# Patient Record
Sex: FEMALE | Race: WHITE | NOT HISPANIC OR LATINO | URBAN - METROPOLITAN AREA
[De-identification: names, ages, dates, MRNs, and addresses within clinical notes are randomized per-mention and may not be internally consistent; named-entity substitution may affect disease eponyms.]

---

## 2021-05-20 ENCOUNTER — OFFICE VISIT (OUTPATIENT)
Dept: URBAN - METROPOLITAN AREA CLINIC 70 | Facility: CLINIC | Age: 71
End: 2021-05-20
Payer: MEDICARE

## 2021-05-20 ENCOUNTER — LAB OUTSIDE AN ENCOUNTER (OUTPATIENT)
Dept: URBAN - METROPOLITAN AREA CLINIC 70 | Facility: CLINIC | Age: 71
End: 2021-05-20

## 2021-05-20 ENCOUNTER — WEB ENCOUNTER (OUTPATIENT)
Dept: URBAN - METROPOLITAN AREA CLINIC 70 | Facility: CLINIC | Age: 71
End: 2021-05-20

## 2021-05-20 ENCOUNTER — DASHBOARD ENCOUNTERS (OUTPATIENT)
Age: 71
End: 2021-05-20

## 2021-05-20 VITALS
HEIGHT: 62 IN | TEMPERATURE: 98.1 F | DIASTOLIC BLOOD PRESSURE: 77 MMHG | BODY MASS INDEX: 39.75 KG/M2 | WEIGHT: 216 LBS | HEART RATE: 75 BPM | SYSTOLIC BLOOD PRESSURE: 121 MMHG

## 2021-05-20 DIAGNOSIS — K44.9 HIATAL HERNIA: ICD-10-CM

## 2021-05-20 DIAGNOSIS — R13.10 ESOPHAGEAL DYSPHAGIA: ICD-10-CM

## 2021-05-20 PROBLEM — 30233002: Status: ACTIVE | Noted: 2021-05-20

## 2021-05-20 PROBLEM — 40890009: Status: ACTIVE | Noted: 2021-05-20

## 2021-05-20 PROCEDURE — 99204 OFFICE O/P NEW MOD 45 MIN: CPT | Performed by: NURSE PRACTITIONER

## 2021-05-20 RX ORDER — PRAVASTATIN SODIUM 40 MG/1
TABLET ORAL
Qty: 90 DELAYED RELEASE TABLET | Refills: 0 | Status: ACTIVE | COMMUNITY

## 2021-05-20 RX ORDER — CLONAZEPAM 0.5 MG/1
(SCHEDULE IV DRUG) TABLET ORAL
Qty: 60 DELAYED RELEASE TABLET | Refills: 2 | Status: ACTIVE | COMMUNITY

## 2021-05-20 RX ORDER — METFORMIN HYDROCHLORIDE 1000 MG/1
TABLET, FILM COATED ORAL
Qty: 180 DELAYED RELEASE TABLET | Refills: 1 | Status: ACTIVE | COMMUNITY

## 2021-05-20 RX ORDER — BUSPIRONE HYDROCHLORIDE 15 MG/1
TABLET ORAL
Qty: 60 DELAYED RELEASE TABLET | Refills: 1 | Status: ACTIVE | COMMUNITY

## 2021-05-20 RX ORDER — CYCLOBENZAPRINE HYDROCHLORIDE 10 MG/1
TABLET, FILM COATED ORAL
Qty: 42 DELAYED RELEASE TABLET | Refills: 0 | Status: ACTIVE | COMMUNITY

## 2021-05-20 RX ORDER — GABAPENTIN 300 MG/1
CAPSULE ORAL
Qty: 30 CAP | Refills: 0 | Status: ACTIVE | COMMUNITY

## 2021-05-20 RX ORDER — PANTOPRAZOLE SODIUM 40 MG/1
1 TABLET TABLET, DELAYED RELEASE ORAL
Qty: 90 | Refills: 1 | OUTPATIENT
Start: 2021-05-20

## 2021-05-20 RX ORDER — ESCITALOPRAM 10 MG/1
TABLET, FILM COATED ORAL
Qty: 45 DELAYED RELEASE TABLET | Refills: 2 | Status: ACTIVE | COMMUNITY

## 2021-05-20 RX ORDER — AMLODIPINE BESYLATE 5 MG/1
TABLET ORAL
Qty: 90 DELAYED RELEASE TABLET | Refills: 1 | Status: ACTIVE | COMMUNITY

## 2021-05-20 RX ORDER — LISINOPRIL 40 MG/1
TABLET ORAL
Qty: 90 DELAYED RELEASE TABLET | Refills: 1 | Status: ACTIVE | COMMUNITY

## 2021-05-20 RX ORDER — HYDROCODONE BITARTRATE AND ACETAMINOPHEN 10; 325 MG/1; MG/1
(SCHEDULE II DRUG) TABLET ORAL
Qty: 63 DELAYED RELEASE TABLET | Refills: 0 | Status: ACTIVE | COMMUNITY

## 2021-05-20 NOTE — HPI-TODAY'S VISIT:
Patient presents today with complaints of dysphagia and to schedule screening colonosocpy.  Voices a history of Schatzki's ring with previous EGD in 2016.  Underwent dilation at that time.  Feels symptoms gradually returned after this and have increased over time.  Dysphagia occurs to both solids and liquids.  This is not a daily complaint but occurs at random.  Chicken and rice tend to cause more issues.  Voices pain after swallowing solids.  Increases liquid consumption or induces vomiting to improve symptoms.  She has not been on antacid therapy. States was informed of hiatal hernia as well during this procedure.   Voices her last colonoscopy as being in 2015/2016.  Denies polyps or history of polyps, family history of colon cancer or colon polyps.

## 2021-08-04 ENCOUNTER — OFFICE VISIT (OUTPATIENT)
Dept: URBAN - METROPOLITAN AREA SURGERY CENTER 24 | Facility: SURGERY CENTER | Age: 71
End: 2021-08-04
Payer: MEDICARE

## 2021-08-04 DIAGNOSIS — K22.2 ACQUIRED ESOPHAGEAL RING: ICD-10-CM

## 2021-08-04 PROCEDURE — 43249 ESOPH EGD DILATION <30 MM: CPT | Performed by: INTERNAL MEDICINE

## 2021-08-04 PROCEDURE — G8907 PT DOC NO EVENTS ON DISCHARG: HCPCS | Performed by: INTERNAL MEDICINE

## 2021-08-04 RX ORDER — GABAPENTIN 300 MG/1
CAPSULE ORAL
Qty: 30 CAP | Refills: 0 | Status: ACTIVE | COMMUNITY

## 2021-08-04 RX ORDER — LISINOPRIL 40 MG/1
TABLET ORAL
Qty: 90 DELAYED RELEASE TABLET | Refills: 1 | Status: ACTIVE | COMMUNITY

## 2021-08-04 RX ORDER — HYDROCODONE BITARTRATE AND ACETAMINOPHEN 10; 325 MG/1; MG/1
(SCHEDULE II DRUG) TABLET ORAL
Qty: 63 DELAYED RELEASE TABLET | Refills: 0 | Status: ACTIVE | COMMUNITY

## 2021-08-04 RX ORDER — METFORMIN HYDROCHLORIDE 1000 MG/1
TABLET, FILM COATED ORAL
Qty: 180 DELAYED RELEASE TABLET | Refills: 1 | Status: ACTIVE | COMMUNITY

## 2021-08-04 RX ORDER — PANTOPRAZOLE SODIUM 40 MG/1
1 TABLET TABLET, DELAYED RELEASE ORAL
Qty: 90 | Refills: 1 | Status: ACTIVE | COMMUNITY
Start: 2021-05-20

## 2021-08-04 RX ORDER — CLONAZEPAM 0.5 MG/1
(SCHEDULE IV DRUG) TABLET ORAL
Qty: 60 DELAYED RELEASE TABLET | Refills: 2 | Status: ACTIVE | COMMUNITY

## 2021-08-04 RX ORDER — ESCITALOPRAM 10 MG/1
TABLET, FILM COATED ORAL
Qty: 45 DELAYED RELEASE TABLET | Refills: 2 | Status: ACTIVE | COMMUNITY

## 2021-08-04 RX ORDER — PRAVASTATIN SODIUM 40 MG/1
TABLET ORAL
Qty: 90 DELAYED RELEASE TABLET | Refills: 0 | Status: ACTIVE | COMMUNITY

## 2021-08-04 RX ORDER — AMLODIPINE BESYLATE 5 MG/1
TABLET ORAL
Qty: 90 DELAYED RELEASE TABLET | Refills: 1 | Status: ACTIVE | COMMUNITY

## 2021-08-04 RX ORDER — CYCLOBENZAPRINE HYDROCHLORIDE 10 MG/1
TABLET, FILM COATED ORAL
Qty: 42 DELAYED RELEASE TABLET | Refills: 0 | Status: ACTIVE | COMMUNITY

## 2021-08-04 RX ORDER — BUSPIRONE HYDROCHLORIDE 15 MG/1
TABLET ORAL
Qty: 60 DELAYED RELEASE TABLET | Refills: 1 | Status: ACTIVE | COMMUNITY

## 2024-09-15 ENCOUNTER — CLAIMS CREATED FROM THE CLAIM WINDOW (OUTPATIENT)
Dept: URBAN - METROPOLITAN AREA MEDICAL CENTER 16 | Facility: MEDICAL CENTER | Age: 74
End: 2024-09-15
Payer: MEDICARE

## 2024-09-15 DIAGNOSIS — R93.3 ABN FINDINGS-GI TRACT: ICD-10-CM

## 2024-09-15 PROCEDURE — 99254 IP/OBS CNSLTJ NEW/EST MOD 60: CPT | Performed by: INTERNAL MEDICINE

## 2024-09-15 PROCEDURE — G8427 DOCREV CUR MEDS BY ELIG CLIN: HCPCS | Performed by: INTERNAL MEDICINE

## 2024-09-15 PROCEDURE — 99222 1ST HOSP IP/OBS MODERATE 55: CPT | Performed by: INTERNAL MEDICINE

## 2024-09-16 ENCOUNTER — CLAIMS CREATED FROM THE CLAIM WINDOW (OUTPATIENT)
Dept: URBAN - METROPOLITAN AREA MEDICAL CENTER 16 | Facility: MEDICAL CENTER | Age: 74
End: 2024-09-16
Payer: MEDICARE

## 2024-09-16 DIAGNOSIS — K57.32 CECAL DIVERTICULITIS: ICD-10-CM

## 2024-09-16 PROCEDURE — 99232 SBSQ HOSP IP/OBS MODERATE 35: CPT | Performed by: INTERNAL MEDICINE

## 2024-10-16 ENCOUNTER — OFFICE VISIT (OUTPATIENT)
Dept: URBAN - METROPOLITAN AREA CLINIC 70 | Facility: CLINIC | Age: 74
End: 2024-10-16
Payer: COMMERCIAL

## 2024-10-16 ENCOUNTER — LAB OUTSIDE AN ENCOUNTER (OUTPATIENT)
Dept: URBAN - METROPOLITAN AREA CLINIC 70 | Facility: CLINIC | Age: 74
End: 2024-10-16

## 2024-10-16 VITALS
WEIGHT: 187.6 LBS | HEART RATE: 82 BPM | SYSTOLIC BLOOD PRESSURE: 114 MMHG | DIASTOLIC BLOOD PRESSURE: 74 MMHG | HEIGHT: 62 IN | BODY MASS INDEX: 34.52 KG/M2 | TEMPERATURE: 97.7 F

## 2024-10-16 DIAGNOSIS — K57.92 DIVERTICULITIS: ICD-10-CM

## 2024-10-16 DIAGNOSIS — Z12.11 COLON CANCER SCREENING: ICD-10-CM

## 2024-10-16 PROBLEM — 307496006: Status: ACTIVE | Noted: 2024-10-16

## 2024-10-16 PROCEDURE — 99214 OFFICE O/P EST MOD 30 MIN: CPT | Performed by: NURSE PRACTITIONER

## 2024-10-16 RX ORDER — GABAPENTIN 300 MG/1
CAPSULE ORAL
Qty: 30 CAP | Refills: 0 | Status: ACTIVE | COMMUNITY

## 2024-10-16 RX ORDER — PRAVASTATIN SODIUM 40 MG/1
TABLET ORAL
Qty: 90 DELAYED RELEASE TABLET | Refills: 0 | Status: DISCONTINUED | COMMUNITY

## 2024-10-16 RX ORDER — LISINOPRIL 40 MG/1
TABLET ORAL
Qty: 90 DELAYED RELEASE TABLET | Refills: 1 | Status: ACTIVE | COMMUNITY

## 2024-10-16 RX ORDER — HYDROCODONE BITARTRATE AND ACETAMINOPHEN 10; 325 MG/1; MG/1
(SCHEDULE II DRUG) TABLET ORAL
Qty: 63 DELAYED RELEASE TABLET | Refills: 0 | Status: ACTIVE | COMMUNITY

## 2024-10-16 RX ORDER — METOPROLOL TARTRATE 50 MG/1
TABLET, FILM COATED ORAL
Qty: 60 TABLET | Status: ACTIVE | COMMUNITY

## 2024-10-16 RX ORDER — CYCLOBENZAPRINE HYDROCHLORIDE 10 MG/1
TABLET, FILM COATED ORAL
Qty: 42 DELAYED RELEASE TABLET | Refills: 0 | Status: ACTIVE | COMMUNITY

## 2024-10-16 RX ORDER — METFORMIN HYDROCHLORIDE 500 MG/1
1 TABLET WITH A MEAL TABLET, FILM COATED ORAL TWICE A DAY
Qty: 180 TABLET | Refills: 1 | Status: ACTIVE | COMMUNITY

## 2024-10-16 RX ORDER — ESCITALOPRAM 10 MG/1
TABLET, FILM COATED ORAL
Qty: 45 DELAYED RELEASE TABLET | Refills: 2 | Status: ACTIVE | COMMUNITY

## 2024-10-16 RX ORDER — BUSPIRONE HYDROCHLORIDE 15 MG/1
TABLET ORAL
Qty: 60 DELAYED RELEASE TABLET | Refills: 1 | Status: ACTIVE | COMMUNITY

## 2024-10-16 RX ORDER — ATORVASTATIN CALCIUM 40 MG/1
TABLET ORAL
Qty: 90 TABLET | Status: ACTIVE | COMMUNITY

## 2024-10-16 RX ORDER — RIVAROXABAN 15 MG/1
TAKE 1 TABLET BY MOUTH DAILY WITH FOOD TABLET, FILM COATED ORAL
Qty: 25 EACH | Refills: 0 | Status: ACTIVE | COMMUNITY

## 2024-10-16 RX ORDER — PANTOPRAZOLE SODIUM 40 MG/1
1 TABLET TABLET, DELAYED RELEASE ORAL
Qty: 90 | Refills: 1 | Status: ON HOLD | COMMUNITY
Start: 2021-05-20

## 2024-10-16 RX ORDER — CLONAZEPAM 0.5 MG/1
(SCHEDULE IV DRUG) TABLET ORAL
Qty: 60 DELAYED RELEASE TABLET | Refills: 2 | Status: ACTIVE | COMMUNITY

## 2024-10-16 RX ORDER — AMLODIPINE BESYLATE 5 MG/1
TABLET ORAL
Qty: 90 DELAYED RELEASE TABLET | Refills: 1 | Status: ACTIVE | COMMUNITY

## 2024-10-16 NOTE — HPI-TODAY'S VISIT:
OV 5/20/21 FELISHA Barrett NP: Patient presents today with complaints of dysphagia and to schedule screening colonosocpy.  Voices a history of Schatzki's ring with previous EGD in 2016.  Underwent dilation at that time.  Feels symptoms gradually returned after this and have increased over time.  Dysphagia occurs to both solids and liquids.  This is not a daily complaint but occurs at random.  Chicken and rice tend to cause more issues.  Voices pain after swallowing solids.  Increases liquid consumption or induces vomiting to improve symptoms.  She has not been on antacid therapy. States was informed of hiatal hernia as well during this procedure. Voices her last colonoscopy as being in 2015/2016.  Denies polyps or history of polyps, family history of colon cancer or colon polyps. - - - - - - - - - - Today 10/16/24: Patient presents today as a hospital follow up. She has lymphoma and was hospitalized 9/13/24-9/18/24 after presenting for c/o abdominal pain with neutropenia/sepsis/new onset A-fib and uncomplicated diverticulitis seen on CT. She was seen by cardiologist ([facility]) and placed on xarelto and was given antibiotics by ID which she has now completed. Reports feeling much better with abdominal pain now resolved. States she discussed further chemo with oncologist (Dr. Andrews) and has chosen to not proceed with further tx. Tolerating diet. Denies fever, wt loss, N/V, constipation, diarrhea, or rectal bleeding. Last colonoscopy by [facility] several years ago with negative results. No Fhx of colon cancer.

## 2025-01-15 ENCOUNTER — OFFICE VISIT (OUTPATIENT)
Dept: URBAN - METROPOLITAN AREA CLINIC 70 | Facility: CLINIC | Age: 75
End: 2025-01-15
Payer: COMMERCIAL

## 2025-01-15 ENCOUNTER — LAB OUTSIDE AN ENCOUNTER (OUTPATIENT)
Dept: URBAN - METROPOLITAN AREA CLINIC 70 | Facility: CLINIC | Age: 75
End: 2025-01-15

## 2025-01-15 VITALS
HEIGHT: 62 IN | TEMPERATURE: 97.7 F | HEART RATE: 73 BPM | SYSTOLIC BLOOD PRESSURE: 95 MMHG | OXYGEN SATURATION: 99 % | WEIGHT: 185.4 LBS | DIASTOLIC BLOOD PRESSURE: 68 MMHG | BODY MASS INDEX: 34.12 KG/M2

## 2025-01-15 DIAGNOSIS — Z12.11 COLON CANCER SCREENING: ICD-10-CM

## 2025-01-15 DIAGNOSIS — R94.8 ABNORMAL POSITRON EMISSION TOMOGRAPHY (PET) SCAN: ICD-10-CM

## 2025-01-15 DIAGNOSIS — K57.92 DIVERTICULITIS: ICD-10-CM

## 2025-01-15 DIAGNOSIS — K52.9 COLITIS: ICD-10-CM

## 2025-01-15 DIAGNOSIS — A04.9 BACTERIAL INTESTINAL INFECTION: ICD-10-CM

## 2025-01-15 PROCEDURE — 99214 OFFICE O/P EST MOD 30 MIN: CPT | Performed by: NURSE PRACTITIONER

## 2025-01-15 RX ORDER — BUSPIRONE HYDROCHLORIDE 15 MG/1
TABLET ORAL
Qty: 60 DELAYED RELEASE TABLET | Refills: 1 | Status: ACTIVE | COMMUNITY

## 2025-01-15 RX ORDER — METOPROLOL TARTRATE 50 MG/1
TABLET, FILM COATED ORAL
Qty: 60 TABLET | Status: ACTIVE | COMMUNITY

## 2025-01-15 RX ORDER — GABAPENTIN 300 MG/1
CAPSULE ORAL
Qty: 30 CAP | Refills: 0 | Status: ACTIVE | COMMUNITY

## 2025-01-15 RX ORDER — TORSEMIDE 10 MG/1
TABLET ORAL
Qty: 60 TABLET | Status: ACTIVE | COMMUNITY

## 2025-01-15 RX ORDER — HYDROCODONE BITARTRATE AND ACETAMINOPHEN 10; 325 MG/1; MG/1
(SCHEDULE II DRUG) TABLET ORAL
Qty: 63 DELAYED RELEASE TABLET | Refills: 0 | Status: ACTIVE | COMMUNITY

## 2025-01-15 RX ORDER — AMLODIPINE BESYLATE 5 MG/1
TABLET ORAL
Qty: 90 DELAYED RELEASE TABLET | Refills: 1 | Status: ACTIVE | COMMUNITY

## 2025-01-15 RX ORDER — ATORVASTATIN CALCIUM 40 MG/1
TABLET ORAL
Qty: 90 TABLET | Status: ACTIVE | COMMUNITY

## 2025-01-15 RX ORDER — PANTOPRAZOLE SODIUM 40 MG/1
1 TABLET TABLET, DELAYED RELEASE ORAL
Qty: 90 | Refills: 1 | Status: ON HOLD | COMMUNITY
Start: 2021-05-20

## 2025-01-15 RX ORDER — CYCLOBENZAPRINE HYDROCHLORIDE 10 MG/1
TABLET, FILM COATED ORAL
Qty: 42 DELAYED RELEASE TABLET | Refills: 0 | Status: DISCONTINUED | COMMUNITY

## 2025-01-15 RX ORDER — ESCITALOPRAM 10 MG/1
TABLET, FILM COATED ORAL
Qty: 45 DELAYED RELEASE TABLET | Refills: 2 | Status: ACTIVE | COMMUNITY

## 2025-01-15 RX ORDER — METOPROLOL TARTRATE 50 MG/1
TABLET, FILM COATED ORAL
Qty: 60 TABLET | Status: DISCONTINUED | COMMUNITY

## 2025-01-15 RX ORDER — RIVAROXABAN 15 MG/1
TAKE 1 TABLET BY MOUTH DAILY WITH FOOD TABLET, FILM COATED ORAL
Qty: 25 EACH | Refills: 0 | Status: ACTIVE | COMMUNITY

## 2025-01-15 RX ORDER — LISINOPRIL 40 MG/1
TABLET ORAL
Qty: 90 DELAYED RELEASE TABLET | Refills: 1 | Status: ACTIVE | COMMUNITY

## 2025-01-15 RX ORDER — METFORMIN HYDROCHLORIDE 500 MG/1
1 TABLET WITH A MEAL TABLET, FILM COATED ORAL TWICE A DAY
Qty: 180 TABLET | Refills: 1 | Status: ACTIVE | COMMUNITY

## 2025-01-15 RX ORDER — CLONAZEPAM 0.5 MG/1
(SCHEDULE IV DRUG) TABLET ORAL
Qty: 60 DELAYED RELEASE TABLET | Refills: 2 | Status: ACTIVE | COMMUNITY

## 2025-01-15 NOTE — HPI-TODAY'S VISIT:
OV 5/20/21 A Arnold NP: Patient presents today with complaints of dysphagia and to schedule screening colonosocpy.  Voices a history of Schatzki's ring with previous EGD in 2016.  Underwent dilation at that time.  Feels symptoms gradually returned after this and have increased over time.  Dysphagia occurs to both solids and liquids.  This is not a daily complaint but occurs at random.  Chicken and rice tend to cause more issues.  Voices pain after swallowing solids.  Increases liquid consumption or induces vomiting to improve symptoms.  She has not been on antacid therapy. States was informed of hiatal hernia as well during this procedure. Voices her last colonoscopy as being in 2015/2016.  Denies polyps or history of polyps, family history of colon cancer or colon polyps. - - - - - - - - - - OV 10/16/24: Patient presents today as a hospital follow up. She has lymphoma and was hospitalized 9/13/24-9/18/24 after presenting for c/o abdominal pain with neutropenia/sepsis/new onset A-fib and uncomplicated diverticulitis seen on CT. She was seen by cardiologist ([facility]) and placed on xarelto and was given antibiotics by ID which she has now completed. Reports feeling much better with abdominal pain now resolved. States she discussed further chemo with oncologist (Dr. Andrews) and has chosen to not proceed with further tx. Tolerating diet. Denies fever, wt loss, N/V, constipation, diarrhea, or rectal bleeding. Last colonoscopy by [facility] several years ago with negative results. No Fhx of colon cancer. - - - - - - - - - -  Today 1/15/25: Patient presents today for abnormal PET scan. A colonoscopy was previously recommended for further evaluation to r/o neoplastic process after episode of diverticulitis but patient was not cleared by cardiology due to new onset A-fib. Most recent PET scan 12/23/24 showed no mets but showed soft tissue stranding of lower anterior abdominal wall and mild diffuse colonic wall thickening. She admits to lower abodminal discomfort and diarrhea with stool mixed with blood that onset a little while after being discharged from hospital in September. Has decreased appetite. Denies fever, N/V, or constipation.

## 2025-01-17 ENCOUNTER — OFFICE VISIT (OUTPATIENT)
Dept: URBAN - METROPOLITAN AREA CLINIC 70 | Facility: CLINIC | Age: 75
End: 2025-01-17

## 2025-01-21 LAB
CAMPYLOBACTER GROUP: NOT DETECTED
CLOSTRIDIUM DIFFICILE TOXINB,QL REAL TIME PCR: NOT DETECTED
CLOSTRIDIUM DIFFICILE: (no result)
NOROVIRUS GI/GII: NOT DETECTED
ROTAVIRUS A: NOT DETECTED
SALMONELLA SPECIES: NOT DETECTED
SHIGA TOXIN 1: NOT DETECTED
SHIGA TOXIN 2: NOT DETECTED
SHIGELLA SPECIES: NOT DETECTED
VIBRIO GROUP: NOT DETECTED
YERSINIA ENTEROCOLITICA: NOT DETECTED

## 2025-01-29 ENCOUNTER — LAB OUTSIDE AN ENCOUNTER (OUTPATIENT)
Dept: URBAN - METROPOLITAN AREA CLINIC 70 | Facility: CLINIC | Age: 75
End: 2025-01-29

## 2025-01-29 ENCOUNTER — OFFICE VISIT (OUTPATIENT)
Dept: URBAN - METROPOLITAN AREA CLINIC 70 | Facility: CLINIC | Age: 75
End: 2025-01-29
Payer: COMMERCIAL

## 2025-01-29 VITALS
OXYGEN SATURATION: 98 % | DIASTOLIC BLOOD PRESSURE: 60 MMHG | HEIGHT: 62 IN | TEMPERATURE: 98 F | BODY MASS INDEX: 38.64 KG/M2 | SYSTOLIC BLOOD PRESSURE: 90 MMHG | WEIGHT: 210 LBS | HEART RATE: 78 BPM

## 2025-01-29 DIAGNOSIS — K57.92 DIVERTICULITIS: ICD-10-CM

## 2025-01-29 DIAGNOSIS — K52.9 COLITIS: ICD-10-CM

## 2025-01-29 DIAGNOSIS — Z12.11 COLON CANCER SCREENING: ICD-10-CM

## 2025-01-29 DIAGNOSIS — R94.8 ABNORMAL POSITRON EMISSION TOMOGRAPHY (PET) SCAN: ICD-10-CM

## 2025-01-29 PROCEDURE — 99214 OFFICE O/P EST MOD 30 MIN: CPT | Performed by: NURSE PRACTITIONER

## 2025-01-29 RX ORDER — BUSPIRONE HYDROCHLORIDE 15 MG/1
TABLET ORAL
Qty: 60 DELAYED RELEASE TABLET | Refills: 1 | Status: ACTIVE | COMMUNITY

## 2025-01-29 RX ORDER — TORSEMIDE 20 MG/1
1 TABLET TABLET ORAL ONCE A DAY
Qty: 30 TABLET | Status: ACTIVE | COMMUNITY

## 2025-01-29 RX ORDER — METFORMIN HYDROCHLORIDE 500 MG/1
1 TABLET WITH A MEAL TABLET, FILM COATED ORAL TWICE A DAY
Qty: 180 TABLET | Refills: 1 | Status: ACTIVE | COMMUNITY

## 2025-01-29 RX ORDER — ESCITALOPRAM 10 MG/1
TABLET, FILM COATED ORAL
Qty: 45 DELAYED RELEASE TABLET | Refills: 2 | Status: ACTIVE | COMMUNITY

## 2025-01-29 RX ORDER — CLONAZEPAM 0.5 MG/1
(SCHEDULE IV DRUG) TABLET ORAL
Qty: 60 DELAYED RELEASE TABLET | Refills: 2 | Status: ACTIVE | COMMUNITY

## 2025-01-29 RX ORDER — HYDROCODONE BITARTRATE AND ACETAMINOPHEN 10; 325 MG/1; MG/1
(SCHEDULE II DRUG) TABLET ORAL
Qty: 63 DELAYED RELEASE TABLET | Refills: 0 | Status: ON HOLD | COMMUNITY

## 2025-01-29 RX ORDER — PANTOPRAZOLE SODIUM 40 MG/1
1 TABLET TABLET, DELAYED RELEASE ORAL
Qty: 90 | Refills: 1 | Status: ON HOLD | COMMUNITY
Start: 2021-05-20

## 2025-01-29 RX ORDER — ATORVASTATIN CALCIUM 40 MG/1
TABLET ORAL
Qty: 90 TABLET | Status: ACTIVE | COMMUNITY

## 2025-01-29 RX ORDER — RIVAROXABAN 15 MG/1
TAKE 1 TABLET BY MOUTH DAILY WITH FOOD TABLET, FILM COATED ORAL
Qty: 25 EACH | Refills: 0 | Status: ACTIVE | COMMUNITY

## 2025-01-29 RX ORDER — AMLODIPINE BESYLATE 5 MG/1
TABLET ORAL
Qty: 90 DELAYED RELEASE TABLET | Refills: 1 | Status: ACTIVE | COMMUNITY

## 2025-01-29 RX ORDER — LISINOPRIL 40 MG/1
TABLET ORAL
Qty: 90 DELAYED RELEASE TABLET | Refills: 1 | Status: ACTIVE | COMMUNITY

## 2025-01-29 RX ORDER — GABAPENTIN 300 MG/1
CAPSULE ORAL
Qty: 30 CAP | Refills: 0 | Status: ACTIVE | COMMUNITY

## 2025-01-29 RX ORDER — METOPROLOL TARTRATE 50 MG/1
TABLET, FILM COATED ORAL
Qty: 60 TABLET | Status: DISCONTINUED | COMMUNITY

## 2025-01-29 NOTE — HPI-TODAY'S VISIT:
OV 5/20/21 FELISHA Barrett NP: Patient presents today with complaints of dysphagia and to schedule screening colonosocpy.  Voices a history of Schatzki's ring with previous EGD in 2016.  Underwent dilation at that time.  Feels symptoms gradually returned after this and have increased over time.  Dysphagia occurs to both solids and liquids.  This is not a daily complaint but occurs at random.  Chicken and rice tend to cause more issues.  Voices pain after swallowing solids.  Increases liquid consumption or induces vomiting to improve symptoms.  She has not been on antacid therapy. States was informed of hiatal hernia as well during this procedure. Voices her last colonoscopy as being in 2015/2016.  Denies polyps or history of polyps, family history of colon cancer or colon polyps. - - - - - - - - - - OV 10/16/24: Patient presents today as a hospital follow up. She has lymphoma and was hospitalized 9/13/24-9/18/24 after presenting for c/o abdominal pain with neutropenia/sepsis/new onset A-fib and uncomplicated diverticulitis seen on CT. She was seen by cardiologist ([facility]) and placed on xarelto and was given antibiotics by ID which she has now completed. Reports feeling much better with abdominal pain now resolved. States she discussed further chemo with oncologist (Dr. Andrews) and has chosen to not proceed with further tx. Tolerating diet. Denies fever, wt loss, N/V, constipation, diarrhea, or rectal bleeding. Last colonoscopy by [facility] several years ago with negative results. No Fhx of colon cancer. - - - - - - - - - -  OV 1/15/25: Patient presents today for abnormal PET scan. A colonoscopy was previously recommended for further evaluation to r/o neoplastic process after episode of diverticulitis but patient was not cleared by cardiology due to new onset A-fib. Most recent PET scan 12/23/24 showed no mets but showed soft tissue stranding of lower anterior abdominal wall and mild diffuse colonic wall thickening. She admits to lower abodminal discomfort and diarrhea with stool mixed with blood that onset a little while after being discharged from hospital in September. Has decreased appetite. Denies fever, N/V, or constipation. - - - - - - - - - - - Today 1/29/25: Patient presents today for follow up. Stool studies were negative for infection. Diarrhea has improved but not resolved. She has f/u appt with cardiology this friday after a recent increase in diuretic. No new GI complaints.

## 2025-02-05 ENCOUNTER — OFFICE VISIT (OUTPATIENT)
Dept: URBAN - METROPOLITAN AREA CLINIC 70 | Facility: CLINIC | Age: 75
End: 2025-02-05
Payer: COMMERCIAL

## 2025-02-05 VITALS
TEMPERATURE: 98.2 F | BODY MASS INDEX: 39.75 KG/M2 | HEIGHT: 62 IN | HEART RATE: 101 BPM | DIASTOLIC BLOOD PRESSURE: 69 MMHG | OXYGEN SATURATION: 99 % | WEIGHT: 216 LBS | SYSTOLIC BLOOD PRESSURE: 106 MMHG

## 2025-02-05 DIAGNOSIS — K57.92 DIVERTICULITIS: ICD-10-CM

## 2025-02-05 DIAGNOSIS — R94.8 ABNORMAL POSITRON EMISSION TOMOGRAPHY (PET) SCAN: ICD-10-CM

## 2025-02-05 DIAGNOSIS — K52.9 COLITIS: ICD-10-CM

## 2025-02-05 DIAGNOSIS — Z12.11 COLON CANCER SCREENING: ICD-10-CM

## 2025-02-05 PROCEDURE — 99214 OFFICE O/P EST MOD 30 MIN: CPT | Performed by: NURSE PRACTITIONER

## 2025-02-05 RX ORDER — RIVAROXABAN 15 MG/1
TAKE 1 TABLET BY MOUTH DAILY WITH FOOD TABLET, FILM COATED ORAL
Qty: 25 EACH | Refills: 0 | Status: ACTIVE | COMMUNITY

## 2025-02-05 RX ORDER — HYDROCODONE BITARTRATE AND ACETAMINOPHEN 10; 325 MG/1; MG/1
(SCHEDULE II DRUG) TABLET ORAL
Qty: 63 DELAYED RELEASE TABLET | Refills: 0 | Status: ON HOLD | COMMUNITY

## 2025-02-05 RX ORDER — METFORMIN HYDROCHLORIDE 500 MG/1
1 TABLET WITH A MEAL TABLET, FILM COATED ORAL TWICE A DAY
Qty: 180 TABLET | Refills: 1 | Status: ACTIVE | COMMUNITY

## 2025-02-05 RX ORDER — LISINOPRIL 40 MG/1
TABLET ORAL
Qty: 90 DELAYED RELEASE TABLET | Refills: 1 | Status: ACTIVE | COMMUNITY

## 2025-02-05 RX ORDER — AMLODIPINE BESYLATE 5 MG/1
TABLET ORAL
Qty: 90 DELAYED RELEASE TABLET | Refills: 1 | Status: ACTIVE | COMMUNITY

## 2025-02-05 RX ORDER — BUSPIRONE HYDROCHLORIDE 15 MG/1
TABLET ORAL
Qty: 60 DELAYED RELEASE TABLET | Refills: 1 | Status: ACTIVE | COMMUNITY

## 2025-02-05 RX ORDER — ESCITALOPRAM 10 MG/1
TABLET, FILM COATED ORAL
Qty: 45 DELAYED RELEASE TABLET | Refills: 2 | Status: ACTIVE | COMMUNITY

## 2025-02-05 RX ORDER — TORSEMIDE 20 MG/1
1 TABLET TABLET ORAL ONCE A DAY
Qty: 30 TABLET | Status: ACTIVE | COMMUNITY

## 2025-02-05 RX ORDER — CLONAZEPAM 0.5 MG/1
(SCHEDULE IV DRUG) TABLET ORAL
Qty: 60 DELAYED RELEASE TABLET | Refills: 2 | Status: ACTIVE | COMMUNITY

## 2025-02-05 RX ORDER — ATORVASTATIN CALCIUM 40 MG/1
TABLET ORAL
Qty: 90 TABLET | Status: ACTIVE | COMMUNITY

## 2025-02-05 RX ORDER — GABAPENTIN 300 MG/1
CAPSULE ORAL
Qty: 30 CAP | Refills: 0 | Status: ACTIVE | COMMUNITY

## 2025-02-05 RX ORDER — PANTOPRAZOLE SODIUM 40 MG/1
1 TABLET TABLET, DELAYED RELEASE ORAL
Qty: 90 | Refills: 1 | Status: ON HOLD | COMMUNITY
Start: 2021-05-20

## 2025-02-05 NOTE — HPI-TODAY'S VISIT:
OV 5/20/21 FELISHA Barrett NP: Patient presents today with complaints of dysphagia and to schedule screening colonosocpy.  Voices a history of Schatzki's ring with previous EGD in 2016.  Underwent dilation at that time.  Feels symptoms gradually returned after this and have increased over time.  Dysphagia occurs to both solids and liquids.  This is not a daily complaint but occurs at random.  Chicken and rice tend to cause more issues.  Voices pain after swallowing solids.  Increases liquid consumption or induces vomiting to improve symptoms.  She has not been on antacid therapy. States was informed of hiatal hernia as well during this procedure. Voices her last colonoscopy as being in 2015/2016.  Denies polyps or history of polyps, family history of colon cancer or colon polyps. - - - - - - - - - - OV 10/16/24: Patient presents today as a hospital follow up. She has lymphoma and was hospitalized 9/13/24-9/18/24 after presenting for c/o abdominal pain with neutropenia/sepsis/new onset A-fib and uncomplicated diverticulitis seen on CT. She was seen by cardiologist ([facility]) and placed on xarelto and was given antibiotics by ID which she has now completed. Reports feeling much better with abdominal pain now resolved. States she discussed further chemo with oncologist (Dr. Andrews) and has chosen to not proceed with further tx. Tolerating diet. Denies fever, wt loss, N/V, constipation, diarrhea, or rectal bleeding. Last colonoscopy by [facility] several years ago with negative results. No Fhx of colon cancer. - - - - - - - - - -  OV 1/15/25: Patient presents today for abnormal PET scan. A colonoscopy was previously recommended for further evaluation to r/o neoplastic process after episode of diverticulitis but patient was not cleared by cardiology due to new onset A-fib. Most recent PET scan 12/23/24 showed no mets but showed soft tissue stranding of lower anterior abdominal wall and mild diffuse colonic wall thickening. She admits to lower abodminal discomfort and diarrhea with stool mixed with blood that onset a little while after being discharged from hospital in September. Has decreased appetite. Denies fever, N/V, or constipation. - - - - - - - - - - - OV 1/29/25: Patient presents today for follow up. Stool studies were negative for infection. Diarrhea has improved but not resolved. She has f/u appt with cardiology this friday after a recent increase in diuretic. No new GI complaints. - - - - - - - - - - Today 2/5/25: Patient presents today for abnormal labs. She was seen by cardiology with clearance given to proceed with colonoscopy. She has lower extremity swelling with being told by cardiology it is not 2/2 to cardiac etiology. She states that recently her albumin was abnormal but lab is currently not available. No hx of liver disease. She has f/u appt with nephrologist today. No new GI complaints.

## 2025-02-06 ENCOUNTER — CLAIMS CREATED FROM THE CLAIM WINDOW (OUTPATIENT)
Dept: URBAN - METROPOLITAN AREA MEDICAL CENTER 42 | Facility: MEDICAL CENTER | Age: 75
End: 2025-02-06
Payer: COMMERCIAL

## 2025-02-06 DIAGNOSIS — R74.01 ABNORMAL/ELEVATED TRANSAMINASE (SGOT, AMINOTRANSFERASE): ICD-10-CM

## 2025-02-06 DIAGNOSIS — R74.8 ABNORMAL ALKALINE PHOSPHATASE TEST: ICD-10-CM

## 2025-02-06 DIAGNOSIS — R93.2 ABNORMAL FINDINGS ON DIAGNOSTIC IMAGING OF LIVER AND BILIARY TRACT: ICD-10-CM

## 2025-02-06 DIAGNOSIS — R18.8 OTHER ASCITES: ICD-10-CM

## 2025-02-06 PROCEDURE — 99222 1ST HOSP IP/OBS MODERATE 55: CPT | Performed by: INTERNAL MEDICINE

## 2025-02-06 PROCEDURE — G8427 DOCREV CUR MEDS BY ELIG CLIN: HCPCS | Performed by: INTERNAL MEDICINE

## 2025-02-06 PROCEDURE — 99254 IP/OBS CNSLTJ NEW/EST MOD 60: CPT | Performed by: INTERNAL MEDICINE

## 2025-02-07 ENCOUNTER — CLAIMS CREATED FROM THE CLAIM WINDOW (OUTPATIENT)
Dept: URBAN - METROPOLITAN AREA MEDICAL CENTER 42 | Facility: MEDICAL CENTER | Age: 75
End: 2025-02-07
Payer: COMMERCIAL

## 2025-02-07 ENCOUNTER — TELEPHONE ENCOUNTER (OUTPATIENT)
Dept: URBAN - METROPOLITAN AREA CLINIC 70 | Facility: CLINIC | Age: 75
End: 2025-02-07

## 2025-02-07 DIAGNOSIS — R93.2 ABNORMAL FINDINGS ON DIAGNOSTIC IMAGING OF LIVER AND BILIARY TRACT: ICD-10-CM

## 2025-02-07 DIAGNOSIS — Z86.19 HISTORY OF HEPATITIS B: ICD-10-CM

## 2025-02-07 DIAGNOSIS — R74.01 ABNORMAL/ELEVATED TRANSAMINASE (SGOT, AMINOTRANSFERASE): ICD-10-CM

## 2025-02-07 PROCEDURE — 99232 SBSQ HOSP IP/OBS MODERATE 35: CPT | Performed by: INTERNAL MEDICINE

## 2025-02-08 ENCOUNTER — CLAIMS CREATED FROM THE CLAIM WINDOW (OUTPATIENT)
Dept: URBAN - METROPOLITAN AREA MEDICAL CENTER 42 | Facility: MEDICAL CENTER | Age: 75
End: 2025-02-08
Payer: COMMERCIAL

## 2025-02-08 DIAGNOSIS — R93.2 ABNORMAL LIVER ULTRASOUND: ICD-10-CM

## 2025-02-08 DIAGNOSIS — R74.01 ABNORMAL/ELEVATED TRANSAMINASE (SGOT, AMINOTRANSFERASE): ICD-10-CM

## 2025-02-08 DIAGNOSIS — R76.8 HEPATITIS B SURFACE ANTIGEN POSITIVE: ICD-10-CM

## 2025-02-08 PROCEDURE — 99232 SBSQ HOSP IP/OBS MODERATE 35: CPT | Performed by: INTERNAL MEDICINE

## 2025-02-09 ENCOUNTER — CLAIMS CREATED FROM THE CLAIM WINDOW (OUTPATIENT)
Dept: URBAN - METROPOLITAN AREA MEDICAL CENTER 42 | Facility: MEDICAL CENTER | Age: 75
End: 2025-02-09
Payer: COMMERCIAL

## 2025-02-09 DIAGNOSIS — R76.8 HEPATITIS B SURFACE ANTIGEN POSITIVE: ICD-10-CM

## 2025-02-09 DIAGNOSIS — K74.69 CIRRHOSIS, CRYPTOGENIC: ICD-10-CM

## 2025-02-09 DIAGNOSIS — R74.01 ABNORMAL/ELEVATED TRANSAMINASE (SGOT, AMINOTRANSFERASE): ICD-10-CM

## 2025-02-09 PROCEDURE — 99232 SBSQ HOSP IP/OBS MODERATE 35: CPT | Performed by: INTERNAL MEDICINE

## 2025-02-13 ENCOUNTER — OFFICE VISIT (OUTPATIENT)
Dept: URBAN - METROPOLITAN AREA MEDICAL CENTER 42 | Facility: MEDICAL CENTER | Age: 75
End: 2025-02-13
Payer: COMMERCIAL

## 2025-02-13 DIAGNOSIS — K52.89 OTHER AND UNSPECIFIED NONINFECTIOUS GASTROENTERITIS AND COLITIS: ICD-10-CM

## 2025-02-13 PROCEDURE — 45380 COLONOSCOPY AND BIOPSY: CPT | Performed by: INTERNAL MEDICINE

## 2025-03-19 ENCOUNTER — OFFICE VISIT (OUTPATIENT)
Dept: URBAN - METROPOLITAN AREA CLINIC 70 | Facility: CLINIC | Age: 75
End: 2025-03-19
Payer: COMMERCIAL

## 2025-03-19 ENCOUNTER — LAB OUTSIDE AN ENCOUNTER (OUTPATIENT)
Dept: URBAN - METROPOLITAN AREA CLINIC 70 | Facility: CLINIC | Age: 75
End: 2025-03-19

## 2025-03-19 VITALS
TEMPERATURE: 98.1 F | SYSTOLIC BLOOD PRESSURE: 126 MMHG | BODY MASS INDEX: 31.65 KG/M2 | WEIGHT: 172 LBS | DIASTOLIC BLOOD PRESSURE: 72 MMHG | HEART RATE: 74 BPM | HEIGHT: 62 IN | OXYGEN SATURATION: 99 %

## 2025-03-19 DIAGNOSIS — K74.60 UNSPECIFIED CIRRHOSIS OF LIVER: ICD-10-CM

## 2025-03-19 DIAGNOSIS — K52.9 COLITIS: ICD-10-CM

## 2025-03-19 DIAGNOSIS — R18.8 OTHER ASCITES: ICD-10-CM

## 2025-03-19 DIAGNOSIS — Z12.11 COLON CANCER SCREENING: ICD-10-CM

## 2025-03-19 DIAGNOSIS — R94.8 ABNORMAL POSITRON EMISSION TOMOGRAPHY (PET) SCAN: ICD-10-CM

## 2025-03-19 DIAGNOSIS — B18.1 CHRONIC HEPATITIS B: ICD-10-CM

## 2025-03-19 DIAGNOSIS — K57.92 DIVERTICULITIS: ICD-10-CM

## 2025-03-19 PROBLEM — 61977001: Status: ACTIVE | Noted: 2025-03-19

## 2025-03-19 PROBLEM — 19943007: Status: ACTIVE | Noted: 2025-03-19

## 2025-03-19 PROBLEM — 389026000: Status: ACTIVE | Noted: 2025-03-19

## 2025-03-19 PROCEDURE — 99214 OFFICE O/P EST MOD 30 MIN: CPT | Performed by: NURSE PRACTITIONER

## 2025-03-19 RX ORDER — GABAPENTIN 300 MG/1
CAPSULE ORAL
Qty: 30 CAP | Refills: 0 | Status: ACTIVE | COMMUNITY

## 2025-03-19 RX ORDER — TENOFOVIR ALAFENAMIDE 25 MG/1
1 TABLET WITH FOOD TABLET ORAL ONCE A DAY
Qty: 10 | Status: ACTIVE | COMMUNITY
Start: 2025-03-19 | End: 2025-03-29

## 2025-03-19 RX ORDER — BUMETANIDE 1 MG/1
TABLET ORAL
Qty: 60 TABLET | Status: ACTIVE | COMMUNITY

## 2025-03-19 RX ORDER — TORSEMIDE 20 MG/1
1 TABLET TABLET ORAL ONCE A DAY
Qty: 30 TABLET | Status: DISCONTINUED | COMMUNITY

## 2025-03-19 RX ORDER — METFORMIN HYDROCHLORIDE 500 MG/1
1 TABLET WITH A MEAL TABLET, FILM COATED ORAL TWICE A DAY
Qty: 180 TABLET | Refills: 1 | Status: DISCONTINUED | COMMUNITY

## 2025-03-19 RX ORDER — HYDROCODONE BITARTRATE AND ACETAMINOPHEN 10; 325 MG/1; MG/1
(SCHEDULE II DRUG) TABLET ORAL
Qty: 63 DELAYED RELEASE TABLET | Refills: 0 | Status: DISCONTINUED | COMMUNITY

## 2025-03-19 RX ORDER — RIVAROXABAN 15 MG/1
TAKE 1 TABLET BY MOUTH DAILY WITH FOOD TABLET, FILM COATED ORAL
Qty: 25 EACH | Refills: 0 | Status: ACTIVE | COMMUNITY

## 2025-03-19 RX ORDER — LISINOPRIL 40 MG/1
TABLET ORAL
Qty: 90 DELAYED RELEASE TABLET | Refills: 1 | COMMUNITY

## 2025-03-19 RX ORDER — BUSPIRONE HYDROCHLORIDE 15 MG/1
1 TABLET TABLET ORAL ONCE A DAY
Qty: 30 TABLET | Refills: 1 | Status: ACTIVE | COMMUNITY

## 2025-03-19 RX ORDER — METFORMIN HYDROCHLORIDE 500 MG/1
TABLET ORAL
Qty: 60 TABLET | Status: ACTIVE | COMMUNITY

## 2025-03-19 RX ORDER — PANTOPRAZOLE SODIUM 40 MG/1
1 TABLET TABLET, DELAYED RELEASE ORAL
Qty: 90 | Refills: 1 | Status: DISCONTINUED | COMMUNITY
Start: 2021-05-20

## 2025-03-19 RX ORDER — MIDODRINE HYDROCHLORIDE 10 MG/1
TABLET ORAL
Qty: 90 TABLET | Status: ACTIVE | COMMUNITY

## 2025-03-19 RX ORDER — ATORVASTATIN CALCIUM 40 MG/1
1 TABLET TABLET ORAL ONCE A DAY
Qty: 90 TABLET | Status: ACTIVE | COMMUNITY

## 2025-03-19 RX ORDER — CLONAZEPAM 0.5 MG/1
(SCHEDULE IV DRUG) TABLET ORAL
Qty: 60 DELAYED RELEASE TABLET | Refills: 2 | Status: ACTIVE | COMMUNITY

## 2025-03-19 RX ORDER — ESCITALOPRAM 10 MG/1
1 TABLET TABLET, FILM COATED ORAL ONCE A DAY
Qty: 30 TABLET | Refills: 2 | Status: ACTIVE | COMMUNITY

## 2025-03-19 RX ORDER — AMLODIPINE BESYLATE 5 MG/1
TABLET ORAL
Qty: 90 DELAYED RELEASE TABLET | Refills: 1 | Status: DISCONTINUED | COMMUNITY

## 2025-03-19 NOTE — HPI-TODAY'S VISIT:
OV 5/20/21 FELISHA Barrett NP: Patient presents today with complaints of dysphagia and to schedule screening colonosocpy.  Voices a history of Schatzki's ring with previous EGD in 2016.  Underwent dilation at that time.  Feels symptoms gradually returned after this and have increased over time.  Dysphagia occurs to both solids and liquids.  This is not a daily complaint but occurs at random.  Chicken and rice tend to cause more issues.  Voices pain after swallowing solids.  Increases liquid consumption or induces vomiting to improve symptoms.  She has not been on antacid therapy. States was informed of hiatal hernia as well during this procedure. Voices her last colonoscopy as being in 2015/2016.  Denies polyps or history of polyps, family history of colon cancer or colon polyps. - - - - - - - - - - OV 10/16/24: Patient presents today as a hospital follow up. She has lymphoma and was hospitalized 9/13/24-9/18/24 after presenting for c/o abdominal pain with neutropenia/sepsis/new onset A-fib and uncomplicated diverticulitis seen on CT. She was seen by cardiologist ([facility]) and placed on xarelto and was given antibiotics by ID which she has now completed. Reports feeling much better with abdominal pain now resolved. States she discussed further chemo with oncologist (Dr. Andrews) and has chosen to not proceed with further tx. Tolerating diet. Denies fever, wt loss, N/V, constipation, diarrhea, or rectal bleeding. Last colonoscopy by [facility] several years ago with negative results. No Fhx of colon cancer. - - - - - - - - - -  OV 1/15/25: Patient presents today for abnormal PET scan. A colonoscopy was previously recommended for further evaluation to r/o neoplastic process after episode of diverticulitis but patient was not cleared by cardiology due to new onset A-fib. Most recent PET scan 12/23/24 showed no mets but showed soft tissue stranding of lower anterior abdominal wall and mild diffuse colonic wall thickening. She admits to lower abodminal discomfort and diarrhea with stool mixed with blood that onset a little while after being discharged from hospital in September. Has decreased appetite. Denies fever, N/V, or constipation. - - - - - - - - - - - OV 1/29/25: Patient presents today for follow up. Stool studies were negative for infection. Diarrhea has improved but not resolved. She has f/u appt with cardiology this friday after a recent increase in diuretic. No new GI complaints. - - - - - - - - - - OV 2/5/25: Patient presents today for abnormal labs. She was seen by cardiology with clearance given to proceed with colonoscopy. She has lower extremity swelling with being told by cardiology it is not 2/2 to cardiac etiology. She states that recently her albumin was abnormal but lab is currently not available. No hx of liver disease. She has f/u appt with nephrologist today. No new GI complaints. - - - - - - - - - - Today 3/19/25: Patient presents today for follow up. Underwent colonoscopy 2/13/25 with findings of diverticulosis and mild erythema in sigmoid but no malignancy. Results discussed with patient. Since last OV, she was admitted to hospital at [facility] for volume overload with JASON and was dx with cirrhosis seen on CT and active Hep B infection. Autoimmune and hep C were negtive. She was placed on diuretic by nephrologist with asciates/anasarca now improved. No alcohol use. No active signs of GI bleeding or evidence of HE. She was seen by ID and started on tx for Hep B with Vemlidy. No current complaints.

## 2025-04-29 ENCOUNTER — TELEPHONE ENCOUNTER (OUTPATIENT)
Dept: URBAN - METROPOLITAN AREA CLINIC 70 | Facility: CLINIC | Age: 75
End: 2025-04-29